# Patient Record
Sex: FEMALE | Race: WHITE | NOT HISPANIC OR LATINO
[De-identification: names, ages, dates, MRNs, and addresses within clinical notes are randomized per-mention and may not be internally consistent; named-entity substitution may affect disease eponyms.]

---
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
## 2020-07-06 ENCOUNTER — APPOINTMENT (OUTPATIENT)
Dept: PEDIATRICS | Facility: CLINIC | Age: 20
End: 2020-07-06
Payer: COMMERCIAL

## 2020-07-06 VITALS
HEIGHT: 64.5 IN | TEMPERATURE: 98 F | SYSTOLIC BLOOD PRESSURE: 118 MMHG | WEIGHT: 157 LBS | BODY MASS INDEX: 26.48 KG/M2 | DIASTOLIC BLOOD PRESSURE: 64 MMHG | OXYGEN SATURATION: 100 % | HEART RATE: 68 BPM

## 2020-07-06 DIAGNOSIS — M54.9 DORSALGIA, UNSPECIFIED: ICD-10-CM

## 2020-07-06 DIAGNOSIS — M54.2 CERVICALGIA: ICD-10-CM

## 2020-07-06 PROCEDURE — 99215 OFFICE O/P EST HI 40 MIN: CPT

## 2020-07-06 NOTE — REVIEW OF SYSTEMS
[Fever] : no fever [Malaise] : malaise [Headache] : headache [Changes in Vision] : no changes in vision [Ear Pain] : no ear pain [Nasal Discharge] : no nasal discharge [Nasal Congestion] : no nasal congestion [Sore Throat] : no sore throat [Hypotonicity] : not hypotonic [Seizure] : no seizures [Abnormal Movements] :  no abnormal movements [Weakness] : no weakness [Lightheadness] : no lightheadness [Dizziness] : no dizziness [Negative] : Heme/Lymph

## 2020-07-06 NOTE — HISTORY OF PRESENT ILLNESS
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 went to get up hit back of head on metal shelf was dizzy nasuea and had headache X 2 days went home fell asleep woke up with light sensitivity and headache denies LOC,and vomiting  [FreeTextEntry6] : \par Headache mostly occipital area, scalp and "inside head" \par Has had 3 concussions in the past\par Nausea after injury, none now, no Vomiting\par Dizziness last night, none now Some Balance Issues when waking up this morning, better now\par no Weakness \par some Irritability\par Not Feeling foggy, no Concentration problems, no Numbness, no Tingling\par No Vision changes, \par some Light Sensitivity and Noise Sensitivity\par Memory problems, \par not Feeling slow\par Insomnia, no Drowsiness\par No fever, No cough, No wheezing, No ear pain, No sore throat, No nasal congestion\par Normal appetite, No vomiting, No diarrhea\par

## 2020-07-06 NOTE — PHYSICAL EXAM
[Normotonic] : normotonic [+2 Patella DTR] : +2 patella DTR [NL] : warm [FreeTextEntry2] : mild scalp tenderness occipital area [FreeTextEntry5] : Pink, noninjected conjunctiva, no discharge, fundus WNL nondilated, no papilledema [de-identified] : No focal deficits, strength 5/5x4, no sensory deficits

## 2020-07-06 NOTE — DISCUSSION/SUMMARY
[FreeTextEntry1] : Reduce physical activity\par No gym or sports until cleared\par Discussed night time sleep routine and getting adequate sleep\par Minimize or avoid use of electronic media including computers, phones and tablets\par Use analgesics prn\par Cool compress to scalp\par Ensure a balanced diet and adequate hydration\par Avoid aggravating factors including loud sounds, bright lights, stress, etc.\par Rest as much as possible\par Pay close observation for new or worsening symptoms\par Instructed to return to office if condition worsens or new symptoms arise\par Go to ER or UC if condition worsens or unable to to get to the office or after office hours\par Follow up with concussion specialist in 3-4 days, earlier if symptoms get worse\par Spent 45 mins\par

## 2020-07-09 ENCOUNTER — APPOINTMENT (OUTPATIENT)
Dept: PEDIATRICS | Facility: CLINIC | Age: 20
End: 2020-07-09
Payer: COMMERCIAL

## 2020-07-09 VITALS
WEIGHT: 157 LBS | TEMPERATURE: 98.6 F | SYSTOLIC BLOOD PRESSURE: 114 MMHG | DIASTOLIC BLOOD PRESSURE: 62 MMHG | HEART RATE: 86 BPM

## 2020-07-09 PROCEDURE — 96132 NRPSYC TST EVAL PHYS/QHP 1ST: CPT | Mod: 59

## 2020-07-09 PROCEDURE — 99215 OFFICE O/P EST HI 40 MIN: CPT | Mod: 25

## 2020-07-09 RX ORDER — HYDROCODONE BITARTRATE AND ACETAMINOPHEN 5; 325 MG/1; MG/1
5-325 TABLET ORAL
Qty: 6 | Refills: 0 | Status: DISCONTINUED | COMMUNITY
Start: 2020-05-21

## 2020-07-09 RX ORDER — IBUPROFEN 600 MG/1
600 TABLET, FILM COATED ORAL
Qty: 20 | Refills: 0 | Status: DISCONTINUED | COMMUNITY
Start: 2020-05-21

## 2020-07-09 RX ORDER — EPINEPHRINE 0.3 MG/.3ML
0.3 INJECTION INTRAMUSCULAR
Refills: 0 | Status: ACTIVE | COMMUNITY
Start: 2020-07-09

## 2020-07-09 RX ORDER — CHLORHEXIDINE GLUCONATE, 0.12% ORAL RINSE 1.2 MG/ML
0.12 SOLUTION DENTAL
Qty: 473 | Refills: 0 | Status: DISCONTINUED | COMMUNITY
Start: 2020-05-21

## 2020-07-09 RX ORDER — MONTELUKAST 10 MG/1
10 TABLET, FILM COATED ORAL
Qty: 90 | Refills: 0 | Status: ACTIVE | COMMUNITY
Start: 2020-04-07

## 2020-07-09 RX ORDER — AMOXICILLIN 500 MG/1
500 CAPSULE ORAL
Qty: 21 | Refills: 0 | Status: DISCONTINUED | COMMUNITY
Start: 2020-05-21

## 2020-07-09 NOTE — CARE PLAN
[Young adult (age 18 - 25) : 7 - 9 hours] : -Your child should have adequate sleep at least 7 - 9 hours per night [Headache management] : May continue to use ibuprofen or acetaminophen as for pain. These are effective in most patients if they are given early and in appropriate doses. In general, we do not recommend over the counter analgesic use more than 2 times per day and 3 times per week due to the concern of analgesic overuse and resulting rebound headaches.  Your child should maintain a headache diary to identify any possible triggers. [Sleep] : It is very important to have adequate sleep hygiene during the recovery period of a concussion. Adequate hygiene will speed up recovery process and thus will improve post concussive symptoms. No TV or electronics 30 minutes before going to bed. No prophylactic medication such as melatonin required at this time.  [If worsening symptoms or signs of increased intracranial pressure such as vomiting, nighttime awakening,] : If worsening symptoms or signs of increased intracranial pressure such as vomiting, nighttime awakening, worsening headache with change in position or Valsalva, alteration of consciousness call or return to the nearest ER. [Lifestyle modifications] : Your child should consume timely meals, adequate hydration, limit caffeine intake, and reduce stress. Relaxation techniques, biofeedback and self-hypnosis can be considered. Your child should avoid unnecessary mental strain that may provoke post-concussion symptoms.

## 2020-07-09 NOTE — HISTORY OF PRESENT ILLNESS
[Other: ____] : [unfilled] [Headaches] : Patient has headaches [Photophobia] : photophobia [Dizziness] : dizziness [Lights] : lights [Prior concussion] : prior concussion  [Loss of consciousness, if Yes - Enter Duration: ___] : no loss of consciousness [Seizure, if Yes - Enter Duration: ___] : no seizure [Phonophobia] : no phonophobia [Neck Pain] : no neck pain [Blurry Vision] : no blurry vision [Double Vision] : no double vision [Tinnitus] : no tinnitus [Nausea] : no nausea [Vomiting] : no vomiting [Confusion] : no confusion [Difficulty Speaking] : no difficulty speaking [Focal Weakness] : no focal weakness [Paresthesias] : no paresthesias [Mood Changes] : no mood changes [Concentration Difficulties] : no concentration difficulties [FreeTextEntry1] : 07/05/2020 [FreeTextEntry2] : hit top of head on metal shelf - c/o intermittent headache since incident - slight dizziness at times - hx of nausea once after injury occurred no nausea since  [de-identified] : SCAT: Headache 3-4/6\par Dizzy: 2/6\par Balance Issues: 2/6\par Light sensitivity: 2/6\par Noise sensitivity: 1/6\par Memory Problems: 3/6

## 2020-07-09 NOTE — ASSESSMENT
[FreeTextEntry1] : Reduce physical activity\par No gym or sports until cleared\par Discussed night time sleep routine and getting adequate sleep\par Minimize or avoid use of electronic media including computers, phones and tablets\par Use analgesics prn\par Ensure a balanced diet and adequate hydration\par Avoid aggravating factors including loud sounds, bright lights, stress, etc.\par Rest as much as possible\par Pay close observation for new or worsening symptoms\par Instructed to return to office if condition worsens or new symptoms arise\par Go to ER or UC if condition worsens or unable to to get to the office or after office hours\par Will Recheck in 2 weeks

## 2020-07-09 NOTE — PHYSICAL EXAM
[Sharp margins] : sharp margins [Limited Balance] : the patient's balance was impaired [Normal] : patient has a normal gait including toe-walking, heel-walking and tandem walking. Romberg sign is negative [Smooth pursuit/saccadic] : not smooth pursuit/saccadic [Papilledema] : no papilledema [Past-pointing] : there was no past-pointing [Tremor] : no tremor present [Dysdiadochokinesia Bilaterally] : not present [Coordination - Dysmetria Impaired Finger-to-Nose Bilateral] : not present [Coordination - Dysmetria Impaired Heel-to-Shin Bilateral] : not present [FreeTextEntry8] : with eyes closed b/l [de-identified] : 23% [de-identified] : 64% [de-identified] : 5% [de-identified] : <1%

## 2020-07-23 ENCOUNTER — APPOINTMENT (OUTPATIENT)
Dept: PEDIATRICS | Facility: CLINIC | Age: 20
End: 2020-07-23
Payer: COMMERCIAL

## 2020-07-23 VITALS — TEMPERATURE: 98.5 F | HEART RATE: 86 BPM | SYSTOLIC BLOOD PRESSURE: 112 MMHG | DIASTOLIC BLOOD PRESSURE: 62 MMHG

## 2020-07-23 DIAGNOSIS — S06.0X0A CONCUSSION W/OUT LOSS OF CONSCIOUSNESS, INITIAL ENCOUNTER: ICD-10-CM

## 2020-07-23 PROCEDURE — 96132 NRPSYC TST EVAL PHYS/QHP 1ST: CPT | Mod: 59

## 2020-07-23 PROCEDURE — 99215 OFFICE O/P EST HI 40 MIN: CPT | Mod: 25

## 2020-07-23 NOTE — ASSESSMENT
[FreeTextEntry1] : IF she remians asymptomatic she may start RTP on Monday.  WIll complete at home due to Pandemic,  Instructed if she experiences any symptoms to stop and call me.  ALso to call on final day of RTP to follow up. Pt to start at Level 2.

## 2020-07-23 NOTE — PHYSICAL EXAM
[Smooth pursuit/saccadic] : smooth pursuit/saccadic [Sharp margins] : sharp margins [Abnormal Walk] : normal gait [Balance] : balance was intact [Normal] : patient has a normal gait including toe-walking, heel-walking and tandem walking. Romberg sign is negative [Improved] : General: improved [Papilledema] : no papilledema [Limited Balance] : balance was intact [Past-pointing] : there was no past-pointing [Tremor] : no tremor present [Coordination - Dysmetria Impaired Heel-to-Shin Bilateral] : not present [Coordination - Dysmetria Impaired Finger-to-Nose Bilateral] : not present [Dysdiadochokinesia Bilaterally] : not present [de-identified] : 5% [de-identified] : 47% [de-identified] : .69 10% [de-identified] : 35% [de-identified] : .35

## 2020-07-23 NOTE — CARE PLAN
[Return to play] : No gym or contact sports for the time being.  Your child needs to be symptom free for at least 24 hours and back to school full time before he or she can be can be re-evaluated in the office to undergo a gradual return to play protocol prior to returning to full contact activities. If your child begins to feel better and has no symptoms light aerobic exercise can be started. If symptoms worsen the activity should be discontinued.  [Young adult (age 18 - 25) : 7 - 9 hours] : -Your child should have adequate sleep at least 7 - 9 hours per night [Sleep] : It is very important to have adequate sleep hygiene during the recovery period of a concussion. Adequate hygiene will speed up recovery process and thus will improve post concussive symptoms. No TV or electronics 30 minutes before going to bed. No prophylactic medication such as melatonin required at this time.  [If worsening symptoms or signs of increased intracranial pressure such as vomiting, nighttime awakening,] : If worsening symptoms or signs of increased intracranial pressure such as vomiting, nighttime awakening, worsening headache with change in position or Valsalva, alteration of consciousness call or return to the nearest ER. [Referral to a specialized pediatric concussion clinic?] : Referral to a specialized pediatric concussion clinic? No

## 2020-07-23 NOTE — HISTORY OF PRESENT ILLNESS
[Other: ____] : [unfilled] [Prior concussion] : prior concussion  [Adequate Water Consumption] : adequate water consumption [Loss of consciousness, if Yes - Enter Duration: ___] : no loss of consciousness [Photophobia] : no photophobia [Seizure, if Yes - Enter Duration: ___] : no seizure [Blurry Vision] : no blurry vision [Phonophobia] : no phonophobia [Neck Pain] : no neck pain [Dizziness] : no dizziness [Tinnitus] : no tinnitus [Double Vision] : no double vision [Confusion] : no confusion [Nausea] : no nausea [Difficulty Speaking] : no difficulty speaking [Vomiting] : no vomiting [Focal Weakness] : no focal weakness [Paresthesias] : no paresthesias [Concentration Difficulties] : no concentration difficulties [Mood Changes] : no mood changes [Difficulty falling asleep] : no difficulty falling asleep [FreeTextEntry1] : 07/05/2020 [Difficulty staying asleep] : no difficulty staying asleep [Feeling more tired than usual] : not feeling more tired than usual [FreeTextEntry2] : hit back of head on metal working station  [de-identified] : Has been asymptomatic since the weekend\par SCAT scores are zero

## 2021-01-26 ENCOUNTER — APPOINTMENT (OUTPATIENT)
Dept: PEDIATRICS | Facility: CLINIC | Age: 21
End: 2021-01-26
Payer: COMMERCIAL

## 2021-01-26 VITALS
SYSTOLIC BLOOD PRESSURE: 114 MMHG | DIASTOLIC BLOOD PRESSURE: 62 MMHG | WEIGHT: 151 LBS | HEART RATE: 76 BPM | HEIGHT: 64 IN | BODY MASS INDEX: 25.78 KG/M2

## 2021-01-26 VITALS
SYSTOLIC BLOOD PRESSURE: 118 MMHG | HEART RATE: 78 BPM | DIASTOLIC BLOOD PRESSURE: 64 MMHG | WEIGHT: 161 LBS | BODY MASS INDEX: 27.49 KG/M2 | HEIGHT: 64.25 IN

## 2021-01-26 DIAGNOSIS — Z78.9 OTHER SPECIFIED HEALTH STATUS: ICD-10-CM

## 2021-01-26 DIAGNOSIS — S06.0X0D CONCUSSION W/OUT LOSS OF CONSCIOUSNESS, SUBSEQUENT ENCOUNTER: ICD-10-CM

## 2021-01-26 DIAGNOSIS — J45.909 UNSPECIFIED ASTHMA, UNCOMPLICATED: ICD-10-CM

## 2021-01-26 DIAGNOSIS — Z00.00 ENCOUNTER FOR GENERAL ADULT MEDICAL EXAMINATION W/OUT ABNORMAL FINDINGS: ICD-10-CM

## 2021-01-26 DIAGNOSIS — Z23 ENCOUNTER FOR IMMUNIZATION: ICD-10-CM

## 2021-01-26 DIAGNOSIS — S09.90XA UNSPECIFIED INJURY OF HEAD, INITIAL ENCOUNTER: ICD-10-CM

## 2021-01-26 DIAGNOSIS — S00.03XA CONTUSION OF SCALP, INITIAL ENCOUNTER: ICD-10-CM

## 2021-01-26 PROCEDURE — 90471 IMMUNIZATION ADMIN: CPT

## 2021-01-26 PROCEDURE — 99173 VISUAL ACUITY SCREEN: CPT | Mod: 59

## 2021-01-26 PROCEDURE — 99395 PREV VISIT EST AGE 18-39: CPT | Mod: 25

## 2021-01-26 PROCEDURE — 92551 PURE TONE HEARING TEST AIR: CPT

## 2021-01-26 PROCEDURE — 90620 MENB-4C VACCINE IM: CPT

## 2021-01-26 PROCEDURE — 96127 BRIEF EMOTIONAL/BEHAV ASSMT: CPT

## 2021-01-26 PROCEDURE — 86580 TB INTRADERMAL TEST: CPT

## 2021-01-26 PROCEDURE — 99072 ADDL SUPL MATRL&STAF TM PHE: CPT

## 2021-01-26 PROCEDURE — 96160 PT-FOCUSED HLTH RISK ASSMT: CPT | Mod: 59

## 2021-01-26 RX ORDER — CETIRIZINE HCL 10 MG
TABLET ORAL
Refills: 0 | Status: ACTIVE | COMMUNITY

## 2021-01-26 RX ORDER — ALBUTEROL SULFATE 90 UG/1
INHALANT RESPIRATORY (INHALATION)
Refills: 0 | Status: ACTIVE | COMMUNITY

## 2021-01-26 NOTE — PHYSICAL EXAM
[Alert] : alert [No Acute Distress] : no acute distress [Normocephalic] : normocephalic [EOMI Bilateral] : EOMI bilateral [Clear tympanic membranes with bony landmarks and light reflex present bilaterally] : clear tympanic membranes with bony landmarks and light reflex present bilaterally  [Pink Nasal Mucosa] : pink nasal mucosa [Nonerythematous Oropharynx] : nonerythematous oropharynx [Supple, full passive range of motion] : supple, full passive range of motion [No Palpable Masses] : no palpable masses [Clear to Auscultation Bilaterally] : clear to auscultation bilaterally [Regular Rate and Rhythm] : regular rate and rhythm [Normal S1, S2 audible] : normal S1, S2 audible [No Murmurs] : no murmurs [+2 Femoral Pulses] : +2 femoral pulses [Soft] : soft [NonTender] : non tender [Non Distended] : non distended [Normoactive Bowel Sounds] : normoactive bowel sounds [No Hepatomegaly] : no hepatomegaly [No Splenomegaly] : no splenomegaly [Dony: ____] : Dony [unfilled] [Dony: _____] : Dony [unfilled] [No Abnormal Lymph Nodes Palpated] : no abnormal lymph nodes palpated [Normal Muscle Tone] : normal muscle tone [No Gait Asymmetry] : no gait asymmetry [No pain or deformities with palpation of bone, muscles, joints] : no pain or deformities with palpation of bone, muscles, joints [Straight] : straight [No Scoliosis] : no scoliosis [+2 Patella DTR] : +2 patella DTR [Cranial Nerves Grossly Intact] : cranial nerves grossly intact [No Rash or Lesions] : no rash or lesions

## 2021-01-26 NOTE — DISCUSSION/SUMMARY
[] : The components of the vaccine(s) to be administered today are listed in the plan of care. The disease(s) for which the vaccine(s) are intended to prevent and the risks have been discussed with the caretaker.  The risks are also included in the appropriate vaccination information statements which have been provided to the patient's caregiver.  The caregiver has given consent to vaccinate. [Met privately with the adolescent for part of the office visit?] : Met privately with the adolescent for part of the office visit? Yes [Adolescent demonstrates understanding of his/her conditions and how to take prescribed medications?] : Adolescent demonstrates understanding of his/her conditions and how to take prescribed medications? Yes [Adolescent asks questions during each office  visit and participates in the care plan?] : Adolescent asks questions during each office visit and participates in the care plan? Yes [Adolescent is competent in independently making appointments, filling prescriptions, following up on referrals, and seeking emergency services, as needed?] : Adolescent is competent in independently making appointments, filling prescriptions, following up on referrals, and seeking emergency services, as needed? Yes

## 2021-01-26 NOTE — HISTORY OF PRESENT ILLNESS
[Yes] : Patient goes to dentist yearly [Normal] : normal [LMP: _____] : LMP: [unfilled] [Grade: ____] : Grade: [unfilled] [No] : No cigarette smoke exposure [Uses safety belts/safety equipment] : uses safety belts/safety equipment  [With Teen] : teen [Sleep Concerns] : no sleep concerns [Impaired/distracted driving] : no impaired/distracted driving [de-identified] : self [FreeTextEntry7] : 20 year [employer], doing well [de-identified] : no concerns today [de-identified] : variety of foods [de-identified] : online classes - doing well [de-identified] : not very active, will be working as a  in a  [FreeTextEntry1] : \par Cardiac Checklist reviewed and discussed as needed\par Coordination of Care reviewed - questions/concerns discussed as needed\par

## 2021-01-28 ENCOUNTER — APPOINTMENT (OUTPATIENT)
Dept: PEDIATRICS | Facility: CLINIC | Age: 21
End: 2021-01-28
Payer: COMMERCIAL

## 2021-01-28 VITALS — TEMPERATURE: 98.9 F

## 2021-01-28 DIAGNOSIS — T88.1XXA OTHER COMPLICATIONS FOLLOWING IMMUNIZATION, NOT ELSEWHERE CLASSIFIED, INITIAL ENCOUNTER: ICD-10-CM

## 2021-01-28 PROCEDURE — 99072 ADDL SUPL MATRL&STAF TM PHE: CPT

## 2021-01-28 PROCEDURE — 99212 OFFICE O/P EST SF 10 MIN: CPT

## 2021-01-28 NOTE — PHYSICAL EXAM
[NL] : normocephalic [Clear to Auscultation Bilaterally] : clear to auscultation bilaterally [Regular Rate and Rhythm] : regular rate and rhythm [Normal S1, S2 audible] : normal S1, S2 audible [Moves All Extremities x 4] : moves all extremities x4 [Warm, Well Perfused x4] : warm, well perfused x4 [Capillary Refill <2s] : capillary refill < 2s [de-identified] : left deltoid area with slightly erythematous round slightly indurated area around where bexsaro was given, mildly tender, slightly warmer than surrounding skin

## 2021-01-28 NOTE — HISTORY OF PRESENT ILLNESS
[de-identified] : had bexsero 2 days ago, c/o redness, swelling, hot to touch and painful when touching on left arm injection site *also here for PPD check 0mm induration noted form completed and given to pt [FreeTextEntry6] : received bexsaro  2 days ago, left arm has red huyen where injection was given\par no fevers, not feeling ill\par not getting worse\par feels sore similar to any vaccine soreness in the arm after she gets vaccine, no worse than usual

## 2021-04-15 ENCOUNTER — NON-APPOINTMENT (OUTPATIENT)
Age: 21
End: 2021-04-15

## 2021-04-20 ENCOUNTER — APPOINTMENT (OUTPATIENT)
Dept: PEDIATRICS | Facility: CLINIC | Age: 21
End: 2021-04-20
Payer: COMMERCIAL

## 2021-04-20 VITALS
BODY MASS INDEX: 27.66 KG/M2 | DIASTOLIC BLOOD PRESSURE: 68 MMHG | SYSTOLIC BLOOD PRESSURE: 120 MMHG | HEIGHT: 64.25 IN | TEMPERATURE: 97.9 F | HEART RATE: 100 BPM | WEIGHT: 162 LBS

## 2021-04-20 DIAGNOSIS — S16.1XXA STRAIN OF MUSCLE, FASCIA AND TENDON AT NECK LEVEL, INITIAL ENCOUNTER: ICD-10-CM

## 2021-04-20 DIAGNOSIS — Z87.820 PERSONAL HISTORY OF TRAUMATIC BRAIN INJURY: ICD-10-CM

## 2021-04-20 DIAGNOSIS — R41.840 ATTENTION AND CONCENTRATION DEFICIT: ICD-10-CM

## 2021-04-20 PROCEDURE — 99214 OFFICE O/P EST MOD 30 MIN: CPT

## 2021-04-20 PROCEDURE — 99072 ADDL SUPL MATRL&STAF TM PHE: CPT

## 2021-04-20 NOTE — PHYSICAL EXAM
[Nontender Cervical Lymph Nodes] : nontender cervical lymph nodes [Supple] : supple [FROM] : full passive range of motion [NL] : warm [de-identified] : slight tender along superior aspect of SCM, musculature tight in same area

## 2021-04-20 NOTE — DISCUSSION/SUMMARY
[FreeTextEntry1] : To get CSpine Xray\par Neuro Consult for concentration issues, adult ADD screen borderline, doesn't fit criteria for conclusive diagnosis ADD\par May consider Neuropsych kasia, aware may not be covered\par Psychology evaluation if Neuro clears pt\par Supportive Care\par RTO if worse

## 2021-04-20 NOTE — HISTORY OF PRESENT ILLNESS
[FreeTextEntry6] : per pt she has a history of two concussions, last one was approx 2 years ago - since has had difficulty concentrating and loud noises bother her \par sharp pain that shoots up the back of head on and off since 4/16/21 \par \par Pt with h/o 3 concussions, last one very mild over the summer. \par started having more difficulties when school went online\par Pt does report procrastinating to get her work done especially if it is something she doesn't like.  Noted more when making friends in college. \par No headaches\par Friday or Saturday she did start having pain in her neck that would radiate to the back of her head, quick shooting pain self resolved.

## 2021-05-06 ENCOUNTER — NON-APPOINTMENT (OUTPATIENT)
Age: 21
End: 2021-05-06

## 2021-05-13 ENCOUNTER — NON-APPOINTMENT (OUTPATIENT)
Age: 21
End: 2021-05-13

## 2021-05-24 ENCOUNTER — NON-APPOINTMENT (OUTPATIENT)
Age: 21
End: 2021-05-24

## 2021-05-25 ENCOUNTER — NON-APPOINTMENT (OUTPATIENT)
Age: 21
End: 2021-05-25

## 2021-06-10 ENCOUNTER — APPOINTMENT (OUTPATIENT)
Dept: PEDIATRICS | Facility: CLINIC | Age: 21
End: 2021-06-10
Payer: COMMERCIAL

## 2021-06-10 VITALS
WEIGHT: 157.3 LBS | TEMPERATURE: 97.3 F | DIASTOLIC BLOOD PRESSURE: 78 MMHG | HEART RATE: 116 BPM | SYSTOLIC BLOOD PRESSURE: 112 MMHG

## 2021-06-10 DIAGNOSIS — R59.9 ENLARGED LYMPH NODES, UNSPECIFIED: ICD-10-CM

## 2021-06-10 DIAGNOSIS — J02.9 ACUTE PHARYNGITIS, UNSPECIFIED: ICD-10-CM

## 2021-06-10 DIAGNOSIS — J01.90 ACUTE SINUSITIS, UNSPECIFIED: ICD-10-CM

## 2021-06-10 DIAGNOSIS — R00.0 TACHYCARDIA, UNSPECIFIED: ICD-10-CM

## 2021-06-10 LAB
POCT - MONO RAPID TEST: NEGATIVE
S PYO AG SPEC QL IA: NEGATIVE

## 2021-06-10 PROCEDURE — 99072 ADDL SUPL MATRL&STAF TM PHE: CPT

## 2021-06-10 PROCEDURE — 87880 STREP A ASSAY W/OPTIC: CPT | Mod: QW

## 2021-06-10 PROCEDURE — 99214 OFFICE O/P EST MOD 30 MIN: CPT | Mod: 25

## 2021-06-10 PROCEDURE — 86308 HETEROPHILE ANTIBODY SCREEN: CPT | Mod: QW

## 2021-06-10 RX ORDER — CEFDINIR 300 MG/1
300 CAPSULE ORAL DAILY
Qty: 20 | Refills: 0 | Status: COMPLETED | COMMUNITY
Start: 2021-06-10 | End: 2021-06-20

## 2021-06-10 NOTE — HISTORY OF PRESENT ILLNESS
[de-identified] : high heart rate [FreeTextEntry6] : Singulair and Zyrtec for allergies\par congested x 2 days\par MRI done by allergist a week ago showed "sinus infection"\par afebrile\par also was noted to have an increased HR at the allergist yesterday, denies anxiety. HR reported as 115 at allergist

## 2021-06-10 NOTE — DISCUSSION/SUMMARY
[FreeTextEntry1] : Recommend antibiotics, nasal saline, and mucinex. Return if symptoms worsen or persist.\par Symptomatic treatment of fever and/or pain discussed\par Stat strep test ordered\par Throat culture, if POSITIVE, pretreated\par Hydrate well\par Handwashing and infection control discussed\par Return to office if febrile > 48 hours or if symptoms get worse\par Go to ER if unable to come to the office or during after hours, parent encouraged to call service first before doing so.\par SUpportive Care

## 2021-06-10 NOTE — PHYSICAL EXAM
[Inflamed Nasal Mucosa] : inflamed nasal mucosa [Erythematous Oropharynx] : erythematous oropharynx [Enlarged Tonsils] : enlarged tonsils  [+3] :  ( +3 ) [NL] : warm [de-identified] : uvula midline

## 2023-12-12 ENCOUNTER — OFFICE (OUTPATIENT)
Facility: LOCATION | Age: 23
Setting detail: OPHTHALMOLOGY
End: 2023-12-12
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
## 2023-12-12 PROCEDURE — NO SHOW FE NO SHOW FEE: Performed by: OPHTHALMOLOGY
